# Patient Record
Sex: MALE | Race: BLACK OR AFRICAN AMERICAN | NOT HISPANIC OR LATINO | Employment: FULL TIME | URBAN - METROPOLITAN AREA
[De-identification: names, ages, dates, MRNs, and addresses within clinical notes are randomized per-mention and may not be internally consistent; named-entity substitution may affect disease eponyms.]

---
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
## 2020-01-02 ENCOUNTER — TELEPHONE (OUTPATIENT)
Dept: CARDIOLOGY | Facility: CLINIC | Age: 20
End: 2020-01-02

## 2020-01-02 NOTE — TELEPHONE ENCOUNTER
----- Message from Elva Mueller MA sent at 1/2/2020  3:40 PM CST -----  Contact: [phone]/self       ----- Message -----  From: Mayra Agosto  Sent: 1/2/2020   3:30 PM CST  To: [facility] Staff    Patient is requesting to speak with office regarding scheduling an appointment. Please advise.

## 2020-01-09 ENCOUNTER — OFFICE VISIT (OUTPATIENT)
Dept: CARDIOLOGY | Facility: CLINIC | Age: 20
End: 2020-01-09

## 2020-01-09 VITALS
WEIGHT: 232 LBS | HEIGHT: 68 IN | BODY MASS INDEX: 35.16 KG/M2 | OXYGEN SATURATION: 96 % | HEART RATE: 76 BPM | DIASTOLIC BLOOD PRESSURE: 70 MMHG | SYSTOLIC BLOOD PRESSURE: 114 MMHG

## 2020-01-09 DIAGNOSIS — Z86.79 HISTORY OF HYPERTROPHIC CARDIOMYOPATHY: ICD-10-CM

## 2020-01-09 PROCEDURE — 99213 PR OFFICE/OUTPT VISIT, EST, LEVL III, 20-29 MIN: ICD-10-PCS | Mod: S$PBB,,, | Performed by: INTERNAL MEDICINE

## 2020-01-09 PROCEDURE — 99999 PR PBB SHADOW E&M-EST. PATIENT-LVL III: ICD-10-PCS | Mod: PBBFAC,,, | Performed by: INTERNAL MEDICINE

## 2020-01-09 PROCEDURE — 99999 PR PBB SHADOW E&M-EST. PATIENT-LVL III: CPT | Mod: PBBFAC,,, | Performed by: INTERNAL MEDICINE

## 2020-01-09 PROCEDURE — 99213 OFFICE O/P EST LOW 20 MIN: CPT | Mod: S$PBB,,, | Performed by: INTERNAL MEDICINE

## 2020-01-09 PROCEDURE — 99213 OFFICE O/P EST LOW 20 MIN: CPT | Mod: PBBFAC,PN | Performed by: INTERNAL MEDICINE

## 2020-01-09 RX ORDER — METOPROLOL TARTRATE 25 MG/1
25 TABLET, FILM COATED ORAL 2 TIMES DAILY
Qty: 180 TABLET | Refills: 3 | Status: SHIPPED | OUTPATIENT
Start: 2020-01-09 | End: 2021-03-24 | Stop reason: SDUPTHER

## 2020-01-09 NOTE — PROGRESS NOTES
Subjective:    Patient ID:  Bennett Villarreal is a 19 y.o. male who presents for follow-up of Follow-up and Cardiomyopathy (ruled out athlete's heart)      PCP/Referring: Tatiana Jose DO     HPI  Pt is a 20 yo M w/ PMH of Hypertrophic Cardiomyopathy (age 14) who presents for f/u appt.  He was last seen by Dr. Bell 8/19/19 for hypertrophic cardiomyopathy evaluation for which he was noted to have asymmetric LVH however this was not c/w prior cardiac MRI which was negative for HOCM performed at an OSH (records in chart) and he was determined OK to play sports and continue metoprolol.  He mentions that he has been doing well.  He mentions that he has been doing well however requires a refill for his metoprolol.  He denies cp, sob, edema, orthopnea, PND, palpitations, presyncope, LOC, or claudication.  He is compliant w/ metoprolol and denies side effects.  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       Past Medical History:   Diagnosis Date    Hypertrophic cardiomyopathy 2014     History reviewed. No pertinent surgical history.  Social History     Socioeconomic History    Marital status: Single     Spouse name: Not on file    Number of children: Not on file    Years of education: Not on file    Highest education level: Not on file   Occupational History    Not on file   Social Needs    Financial resource strain: Not very hard    Food insecurity:     Worry: Never true     Inability: Never true    Transportation needs:     Medical: No     Non-medical: No   Tobacco Use    Smoking status: Never Smoker    Smokeless tobacco: Never Used   Substance and Sexual Activity    Alcohol use: Never     Frequency: Never     Drinks per session: Patient refused     Binge frequency: Patient refused    Drug use: Never    Sexual activity: Never   Lifestyle    Physical activity:     Days per week: 7 days     Minutes per session: 150+ min    Stress: Not at all   Relationships    Social  connections:     Talks on phone: More than three times a week     Gets together: Three times a week     Attends Hindu service: Not on file     Active member of club or organization: Yes     Attends meetings of clubs or organizations: More than 4 times per year     Relationship status: Patient refused   Other Topics Concern    Not on file   Social History Narrative    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 Has 1 brother and 1 sister     Family History   Problem Relation Age of Onset    Heart disease Mother     Hypertrophic cardiomyopathy Father     Asthma Sister     Asthma Brother     Heart disease Maternal Grandmother     Heart disease Maternal Grandfather     No Known Problems Paternal Grandmother     No Known Problems Paternal Grandfather        Review of patient's allergies indicates:   Allergen Reactions    Pcn [penicillins]        Medication List with Changes/Refills   Current Medications    IPRATROPIUM (ATROVENT HFA) 17 MCG/ACTUATION INHALER    Inhale 1 puff into the lungs every 6 (six) hours. Rescue   Changed and/or Refilled Medications    Modified Medication Previous Medication    METOPROLOL TARTRATE (LOPRESSOR) 25 MG TABLET metoprolol tartrate (LOPRESSOR) 25 MG tablet       Take 1 tablet (25 mg total) by mouth 2 (two) times daily.           Review of Systems   Constitution: Negative for diaphoresis and fever.   HENT: Negative for congestion and hearing loss.    Eyes: Negative for blurred vision and pain.   Cardiovascular: Negative for chest pain, claudication, dyspnea on exertion, leg swelling, near-syncope, palpitations and syncope.   Respiratory: Negative for shortness of breath and sleep disturbances due to breathing.    Hematologic/Lymphatic: Negative for bleeding problem. Does not bruise/bleed easily.   Skin: Negative for color change and poor wound healing.   Gastrointestinal: Negative for abdominal pain and nausea.   Genitourinary:  "Negative for bladder incontinence and flank pain.   Neurological: Negative for focal weakness and light-headedness.        Objective:   /70 (BP Location: Left arm, Patient Position: Sitting, BP Method: Large (Manual))   Pulse 76   Ht 5' 8" (1.727 m)   Wt 105.2 kg (232 lb)   SpO2 96%   BMI 35.28 kg/m²    Physical Exam   Constitutional: He is oriented to person, place, and time. He appears well-developed and well-nourished.   HENT:   Head: Normocephalic and atraumatic.   Mouth/Throat: Oropharynx is clear and moist.   Eyes: Pupils are equal, round, and reactive to light. EOM are normal. No scleral icterus.   Neck: Normal range of motion. Neck supple. No JVD present.   Cardiovascular: Normal rate, regular rhythm, S1 normal, S2 normal and intact distal pulses. Exam reveals no gallop and no friction rub.   No murmur heard.  Pulmonary/Chest: Effort normal and breath sounds normal. No respiratory distress. He has no wheezes. He has no rales. He exhibits no tenderness.   Abdominal: Soft. Bowel sounds are normal. He exhibits no distension and no mass. There is no tenderness. There is no rebound.   Musculoskeletal: Normal range of motion. He exhibits no edema or tenderness.   Neurological: He is alert and oriented to person, place, and time. He displays normal reflexes. Coordination normal.   Skin: Skin is warm and dry. He is not diaphoretic. No pallor.   Psychiatric: He has a normal mood and affect. His behavior is normal. Judgment normal.         Review of outside cardiology records show pt had a cardiac MRI in 9/2015 which showed:  No MRI evidence of asymetric septal hypertrophy to suggest the presence of HOCM.  Very mild thickening of the IVS present is present only in systole.    Normal myocardial perfusion and viability; normal LV mass.  Normal biventricular systolic function and wall motion without evidence of hypertrophy or dilatation.  Normal proximal course and caliber of coronary arteries.      Blood " pressure noted to be elevated at that time.  Pt started on metoprolol 25 bid with significant improvement.      EKG 8/5/2019:  Normal sinus rhythm  Possible Septal infarct ,age undetermined     Echo 8/13/2019:   · Asymmetric left ventricular hypertrophy.  · Normal left ventricular systolic function. The estimated ejection fraction is 60%  · Normal LV diastolic function.  · Normal right ventricular systolic function.  · Normal central venous pressure (3 mm Hg).    Assessment:       1. History of hypertrophic cardiomyopathy         Plan:         History of hypertrophic cardiomyopathy  Compensated.  Cardiac MRI was negative for HOCM and echo notes asymmetric LVH.  Pt was followed by Dr. Bell and noted to be OK to participate in sports.    - continue metoprolol  - OK for sports participation  - avoid dehydration       Total duration of face to face visit time 30 minutes.  Total time spent counseling greater than fifty percent of total visit time.  Counseling included discussion regarding imaging findings, diagnosis, possibilities, treatment options, risks and benefits.  The patient had many questions regarding the options and long-term effects      Weor Baum M.D.  Interventional Cardiology

## 2020-01-09 NOTE — ASSESSMENT & PLAN NOTE
Compensated.  Cardiac MRI was negative for HOCM and echo notes asymmetric LVH.  Pt was followed by Dr. Bell and noted to be OK to participate in sports.    - continue metoprolol  - OK for sports participation  - avoid dehydration

## 2020-10-05 ENCOUNTER — PATIENT MESSAGE (OUTPATIENT)
Dept: ADMINISTRATIVE | Facility: HOSPITAL | Age: 20
End: 2020-10-05

## 2021-01-04 ENCOUNTER — PATIENT MESSAGE (OUTPATIENT)
Dept: ADMINISTRATIVE | Facility: HOSPITAL | Age: 21
End: 2021-01-04

## 2021-03-25 ENCOUNTER — PATIENT MESSAGE (OUTPATIENT)
Dept: CARDIOLOGY | Facility: CLINIC | Age: 21
End: 2021-03-25

## 2021-04-12 ENCOUNTER — PATIENT MESSAGE (OUTPATIENT)
Dept: ADMINISTRATIVE | Facility: HOSPITAL | Age: 21
End: 2021-04-12

## 2021-05-04 ENCOUNTER — PATIENT MESSAGE (OUTPATIENT)
Dept: RESEARCH | Facility: HOSPITAL | Age: 21
End: 2021-05-04

## 2021-05-30 ENCOUNTER — IMMUNIZATION (OUTPATIENT)
Dept: PRIMARY CARE CLINIC | Facility: CLINIC | Age: 21
End: 2021-05-30

## 2021-05-30 DIAGNOSIS — Z23 NEED FOR VACCINATION: Primary | ICD-10-CM

## 2021-05-30 PROCEDURE — 0001A COVID-19, MRNA, LNP-S, PF, 30 MCG/0.3 ML DOSE VACCINE: CPT | Mod: CV19,,, | Performed by: FAMILY MEDICINE

## 2021-05-30 PROCEDURE — 0001A COVID-19, MRNA, LNP-S, PF, 30 MCG/0.3 ML DOSE VACCINE: ICD-10-PCS | Mod: CV19,,, | Performed by: FAMILY MEDICINE

## 2021-05-30 PROCEDURE — 91300 COVID-19, MRNA, LNP-S, PF, 30 MCG/0.3 ML DOSE VACCINE: CPT | Mod: ,,, | Performed by: FAMILY MEDICINE

## 2021-05-30 PROCEDURE — 91300 COVID-19, MRNA, LNP-S, PF, 30 MCG/0.3 ML DOSE VACCINE: ICD-10-PCS | Mod: ,,, | Performed by: FAMILY MEDICINE

## 2021-06-22 ENCOUNTER — IMMUNIZATION (OUTPATIENT)
Dept: FAMILY MEDICINE | Facility: CLINIC | Age: 21
End: 2021-06-22
Payer: MEDICAID

## 2021-06-22 DIAGNOSIS — Z23 NEED FOR VACCINATION: Primary | ICD-10-CM

## 2021-06-22 PROCEDURE — 91300 COVID-19, MRNA, LNP-S, PF, 30 MCG/0.3 ML DOSE VACCINE: CPT | Mod: ,,, | Performed by: FAMILY MEDICINE

## 2021-06-22 PROCEDURE — 91300 COVID-19, MRNA, LNP-S, PF, 30 MCG/0.3 ML DOSE VACCINE: ICD-10-PCS | Mod: ,,, | Performed by: FAMILY MEDICINE

## 2021-06-22 PROCEDURE — 0002A COVID-19, MRNA, LNP-S, PF, 30 MCG/0.3 ML DOSE VACCINE: ICD-10-PCS | Mod: CV19,,, | Performed by: FAMILY MEDICINE

## 2021-06-22 PROCEDURE — 0002A COVID-19, MRNA, LNP-S, PF, 30 MCG/0.3 ML DOSE VACCINE: CPT | Mod: CV19,,, | Performed by: FAMILY MEDICINE

## 2021-07-06 ENCOUNTER — PATIENT MESSAGE (OUTPATIENT)
Dept: ADMINISTRATIVE | Facility: HOSPITAL | Age: 21
End: 2021-07-06

## 2021-10-04 ENCOUNTER — PATIENT MESSAGE (OUTPATIENT)
Dept: ADMINISTRATIVE | Facility: HOSPITAL | Age: 21
End: 2021-10-04

## 2022-01-10 ENCOUNTER — PATIENT MESSAGE (OUTPATIENT)
Dept: ADMINISTRATIVE | Facility: HOSPITAL | Age: 22
End: 2022-01-10
Payer: MEDICAID

## 2022-05-31 ENCOUNTER — PATIENT MESSAGE (OUTPATIENT)
Dept: ADMINISTRATIVE | Facility: HOSPITAL | Age: 22
End: 2022-05-31
Payer: MEDICAID

## 2022-08-16 ENCOUNTER — IMMUNIZATION (OUTPATIENT)
Dept: FAMILY MEDICINE | Facility: CLINIC | Age: 22
End: 2022-08-16
Payer: MEDICAID

## 2022-08-16 DIAGNOSIS — Z23 NEED FOR VACCINATION: Primary | ICD-10-CM

## 2022-08-16 PROCEDURE — 91305 COVID-19, MRNA, LNP-S, PF, 30 MCG/0.3 ML DOSE VACCINE (PFIZER): CPT | Mod: PBBFAC,PN

## 2023-05-01 RX ORDER — METOPROLOL TARTRATE 25 MG/1
25 TABLET, FILM COATED ORAL 2 TIMES DAILY
Qty: 180 TABLET | Refills: 3 | Status: CANCELLED | OUTPATIENT
Start: 2023-05-01

## 2023-05-02 NOTE — TELEPHONE ENCOUNTER
Pt's mother answered the phone and stated she called about getting pt's med refilled, informed her that the pt would need to be seen before we can continue to refill the medication.   Pt mother stated the patient is sleeping but she will make an apointment with the patient via [employer] later today. Pt mother expressed understanding

## 2023-06-30 RX ORDER — METOPROLOL TARTRATE 25 MG/1
25 TABLET, FILM COATED ORAL 2 TIMES DAILY
Qty: 60 TABLET | Refills: 0 | Status: SHIPPED | OUTPATIENT
Start: 2023-06-30 | End: 2023-08-16 | Stop reason: SDUPTHER

## 2023-08-15 RX ORDER — METOPROLOL TARTRATE 25 MG/1
25 TABLET, FILM COATED ORAL 2 TIMES DAILY
Qty: 60 TABLET | Refills: 0 | OUTPATIENT
Start: 2023-08-15

## 2023-08-15 NOTE — TELEPHONE ENCOUNTER
Spoke with patient about a medication refill request. Scheduled an appt for 8/16 at 1:30pm. Patient expressed understanding

## 2023-08-16 ENCOUNTER — OFFICE VISIT (OUTPATIENT)
Dept: CARDIOLOGY | Facility: CLINIC | Age: 23
End: 2023-08-16
Payer: MEDICAID

## 2023-08-16 VITALS
HEIGHT: 68 IN | OXYGEN SATURATION: 97 % | HEART RATE: 72 BPM | SYSTOLIC BLOOD PRESSURE: 130 MMHG | WEIGHT: 274.19 LBS | BODY MASS INDEX: 41.56 KG/M2 | DIASTOLIC BLOOD PRESSURE: 84 MMHG

## 2023-08-16 DIAGNOSIS — Z86.79 HISTORY OF HYPERTROPHIC CARDIOMYOPATHY: Primary | ICD-10-CM

## 2023-08-16 PROCEDURE — 99213 OFFICE O/P EST LOW 20 MIN: CPT | Mod: PBBFAC,PN

## 2023-08-16 PROCEDURE — 99204 OFFICE O/P NEW MOD 45 MIN: CPT | Mod: S$PBB,,,

## 2023-08-16 PROCEDURE — 1159F PR MEDICATION LIST DOCUMENTED IN MEDICAL RECORD: ICD-10-PCS | Mod: CPTII,,,

## 2023-08-16 PROCEDURE — 3079F DIAST BP 80-89 MM HG: CPT | Mod: CPTII,,,

## 2023-08-16 PROCEDURE — 3075F SYST BP GE 130 - 139MM HG: CPT | Mod: CPTII,,,

## 2023-08-16 PROCEDURE — 99999 PR PBB SHADOW E&M-EST. PATIENT-LVL III: CPT | Mod: PBBFAC,,,

## 2023-08-16 PROCEDURE — 99204 PR OFFICE/OUTPT VISIT, NEW, LEVL IV, 45-59 MIN: ICD-10-PCS | Mod: S$PBB,,,

## 2023-08-16 PROCEDURE — 1160F RVW MEDS BY RX/DR IN RCRD: CPT | Mod: CPTII,,,

## 2023-08-16 PROCEDURE — 3079F PR MOST RECENT DIASTOLIC BLOOD PRESSURE 80-89 MM HG: ICD-10-PCS | Mod: CPTII,,,

## 2023-08-16 PROCEDURE — 99999 PR PBB SHADOW E&M-EST. PATIENT-LVL III: ICD-10-PCS | Mod: PBBFAC,,,

## 2023-08-16 PROCEDURE — 1159F MED LIST DOCD IN RCRD: CPT | Mod: CPTII,,,

## 2023-08-16 PROCEDURE — 3075F PR MOST RECENT SYSTOLIC BLOOD PRESS GE 130-139MM HG: ICD-10-PCS | Mod: CPTII,,,

## 2023-08-16 PROCEDURE — 1160F PR REVIEW ALL MEDS BY PRESCRIBER/CLIN PHARMACIST DOCUMENTED: ICD-10-PCS | Mod: CPTII,,,

## 2023-08-16 PROCEDURE — 3008F BODY MASS INDEX DOCD: CPT | Mod: CPTII,,,

## 2023-08-16 PROCEDURE — 3008F PR BODY MASS INDEX (BMI) DOCUMENTED: ICD-10-PCS | Mod: CPTII,,,

## 2023-08-16 RX ORDER — METOPROLOL TARTRATE 25 MG/1
25 TABLET, FILM COATED ORAL 2 TIMES DAILY
Qty: 180 TABLET | Refills: 3 | Status: SHIPPED | OUTPATIENT
Start: 2023-08-16 | End: 2023-12-23 | Stop reason: SDUPTHER

## 2023-08-16 NOTE — PROGRESS NOTES
Subjective:    Patient ID:  Bennett Villarreal is a 23 y.o. male who presents for follow-up of Medication Refill      PCP: Tatiana Jose DO     Referring Provider:     HPI: Pt is a 24 yo M w/ PMH of Hypertrophic Cardiomyopathy (age 14) who presents for f/u appt and medication refill. He was last seen by Dr. Baum on 1/9/2020 and was continued on medical therapyr hypertrophic cardiomyopathy.  He was seen prior by Dr. Bell 8/19/19 for hypertrophic cardiomyopathy evaluation for which he was noted to have asymmetric LVH however this was not c/w prior cardiac MRI which was negative for HOCM performed at an OSH (records in chart) and he was determined OK to play sports and continue metoprolol. He mentions that he has been doing well however requires a refill for his metoprolol. He relocated to [place] for work but returns home often. He denies cp, sob, edema, orthopnea, PND, palpitations, presyncope, LOC, or claudication.  He is compliant w/ metoprolol and denies side effects.  He exercises regularly and is very active at work and denies limitation.      Past Medical History:   Diagnosis Date    Hypertrophic cardiomyopathy 2014     No past surgical history on file.  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 Has 1 brother and 1 sister     Social Determinants of Health     Financial Resource Strain: Low Risk  (8/15/2023)    Overall Financial Resource Strain (CARDIA)     Difficulty of Paying Living Expenses: Not hard at all   Food Insecurity: No Food Insecurity (8/15/2023)    Hunger Vital Sign     Worried About Running Out of Food in the Last Year: Never true     Ran Out of Food in the Last Year: Never true    Transportation Needs: No Transportation Needs (8/15/2023)    PRAPARE - Transportation     Lack of Transportation (Medical): No     Lack of Transportation (Non-Medical): No   Physical Activity: Sufficiently Active (8/15/2023)    Exercise Vital Sign     Days of Exercise per Week: 6 days     Minutes of Exercise per Session: 150+ min   Stress: Stress Concern Present (8/15/2023)    [employer] [place] [employer] - Occupational Stress Questionnaire     Feeling of Stress : To some extent   Social Connections: Unknown (8/15/2023)    Social Connection and Isolation Panel [NHANES]     Frequency of Communication with Friends and Family: More than three times a week     Frequency of Social Gatherings with Friends and Family: More than three times a week     Active Member of Clubs or Organizations: No     Attends Club or Organization Meetings: Never     Marital Status: Never    Housing Stability: Low Risk  (8/15/2023)    Housing Stability Vital Sign     Unable to Pay for Housing in the Last Year: No     Number of Places Lived in the Last Year: 2     Unstable Housing in the Last Year: No     Family History   Problem Relation Age of Onset    Heart disease Mother     Hypertrophic cardiomyopathy Father     Asthma Sister     Asthma Brother     Heart disease Maternal Grandmother     Heart disease Maternal Grandfather     No Known Problems Paternal Grandmother     No Known Problems Paternal Grandfather        Review of patient's allergies indicates:   Allergen Reactions    Pcn [penicillins]        Medication List with Changes/Refills   Current Medications    HYDROXYZINE HCL (ATARAX) 25 MG TABLET    Take 1 tablet (25 mg total) by mouth every 6 (six) hours.    IPRATROPIUM (ATROVENT HFA) 17 MCG/ACTUATION INHALER    Inhale 1 puff into the lungs every 6 (six) hours. Rescue   Changed and/or Refilled Medications    Modified Medication Previous Medication    METOPROLOL TARTRATE (LOPRESSOR) 25 MG TABLET metoprolol tartrate  "(LOPRESSOR) 25 MG tablet       Take 1 tablet (25 mg total) by mouth 2 (two) times daily.    Take 1 tablet (25 mg total) by mouth 2 (two) times daily.       Review of Systems   Constitutional: Negative for diaphoresis and fever.   HENT:  Negative for congestion and hearing loss.    Eyes:  Negative for blurred vision and pain.   Cardiovascular:  Negative for chest pain, claudication, dyspnea on exertion, leg swelling, near-syncope, palpitations and syncope.   Respiratory:  Negative for shortness of breath and sleep disturbances due to breathing.    Hematologic/Lymphatic: Negative for bleeding problem. Does not bruise/bleed easily.   Skin:  Negative for color change and poor wound healing.   Gastrointestinal:  Negative for abdominal pain and nausea.   Genitourinary:  Negative for bladder incontinence and flank pain.   Neurological:  Negative for focal weakness and light-headedness.        Objective:   /84 (BP Location: Right arm, Patient Position: Sitting, BP Method: X-Large (Manual))   Pulse 72   Ht 5' 8" (1.727 m)   Wt 124.4 kg (274 lb 3.2 oz)   SpO2 97%   BMI 41.69 kg/m²    Physical Exam  Constitutional:       Appearance: He is well-developed. He is not diaphoretic.   HENT:      Head: Normocephalic and atraumatic.   Eyes:      General: No scleral icterus.     Pupils: Pupils are equal, round, and reactive to light.   Neck:      Vascular: No JVD.   Cardiovascular:      Rate and Rhythm: Normal rate and regular rhythm.      Pulses: Intact distal pulses.           Radial pulses are 2+ on the right side and 2+ on the left side.        Dorsalis pedis pulses are 2+ on the right side and 2+ on the left side.        Posterior tibial pulses are 2+ on the right side and 2+ on the left side.      Heart sounds: S1 normal and S2 normal. No murmur heard.     No friction rub. No gallop.   Pulmonary:      Effort: Pulmonary effort is normal. No respiratory distress.      Breath sounds: Normal breath sounds. No wheezing or " rales.   Chest:      Chest wall: No tenderness.   Abdominal:      General: Bowel sounds are normal. There is no distension.      Palpations: Abdomen is soft. There is no mass.      Tenderness: There is no abdominal tenderness. There is no rebound.   Musculoskeletal:         General: No tenderness. Normal range of motion.      Cervical back: Normal range of motion and neck supple.   Skin:     General: Skin is warm and dry.      Coloration: Skin is not pale.   Neurological:      Mental Status: He is alert and oriented to person, place, and time.      Coordination: Coordination normal.      Deep Tendon Reflexes: Reflexes normal.   Psychiatric:         Behavior: Behavior normal.         Judgment: Judgment normal.           Review of outside cardiology records show pt had a cardiac MRI in 9/2015 which showed:  No MRI evidence of asymetric septal hypertrophy to suggest the presence of HOCM.  Very mild thickening of the IVS present is present only in systole.    Normal myocardial perfusion and viability; normal LV mass.  Normal biventricular systolic function and wall motion without evidence of hypertrophy or dilatation.  Normal proximal course and caliber of coronary arteries.      Blood pressure noted to be elevated at that time.  Pt started on metoprolol 25 bid with significant improvement.       EKG 8/5/2019:  Normal sinus rhythm  Possible Septal infarct ,age undetermined     Echo 8/13/2019:   Asymmetric left ventricular hypertrophy.  Normal left ventricular systolic function. The estimated ejection fraction is 60%  Normal LV diastolic function.  Normal right ventricular systolic function.  Normal central venous pressure (3 mm Hg).    Assessment:       1. History of hypertrophic cardiomyopathy         Plan:         History of hypertrophic cardiomyopathy  Compensated.    Cardiac MRI was negative for HOCM and echo notes asymmetric LVH.  Pt was followed by Dr. Bell and noted to be OK to participate in sports.    -  continue metoprolol  - avoid dehydration       Total duration of face to face visit time 30 minutes.  Total time spent counseling greater than fifty percent of total visit time.  Counseling included discussion regarding imaging findings, diagnosis, possibilities, treatment options, risks and benefits.  The patient had many questions regarding the options and long-term effects      Adama Bunn NP  Cardiology

## 2023-08-16 NOTE — ASSESSMENT & PLAN NOTE
Compensated.    Cardiac MRI was negative for HOCM and echo notes asymmetric LVH.  Pt was followed by Dr. Bell and noted to be OK to participate in sports.    - continue metoprolol  - avoid dehydration

## 2023-08-21 ENCOUNTER — PATIENT MESSAGE (OUTPATIENT)
Dept: RESEARCH | Facility: HOSPITAL | Age: 23
End: 2023-08-21
Payer: MEDICAID

## 2023-12-23 DIAGNOSIS — Z86.79 HISTORY OF HYPERTROPHIC CARDIOMYOPATHY: ICD-10-CM

## 2023-12-27 RX ORDER — METOPROLOL TARTRATE 25 MG/1
25 TABLET, FILM COATED ORAL 2 TIMES DAILY
Qty: 180 TABLET | Refills: 3 | Status: SHIPPED | OUTPATIENT
Start: 2023-12-27 | End: 2024-02-22 | Stop reason: SDUPTHER

## 2024-02-22 DIAGNOSIS — Z86.79 HISTORY OF HYPERTROPHIC CARDIOMYOPATHY: ICD-10-CM

## 2024-02-29 RX ORDER — METOPROLOL TARTRATE 25 MG/1
25 TABLET, FILM COATED ORAL 2 TIMES DAILY
Qty: 180 TABLET | Refills: 3 | Status: SHIPPED | OUTPATIENT
Start: 2024-02-29

## 2024-11-01 DIAGNOSIS — Z86.79 HISTORY OF HYPERTROPHIC CARDIOMYOPATHY: ICD-10-CM

## 2024-11-04 ENCOUNTER — PATIENT MESSAGE (OUTPATIENT)
Dept: CARDIOLOGY | Facility: CLINIC | Age: 24
End: 2024-11-04
Payer: MEDICAID

## 2024-11-04 RX ORDER — METOPROLOL TARTRATE 25 MG/1
25 TABLET, FILM COATED ORAL 2 TIMES DAILY
Qty: 180 TABLET | Refills: 0 | Status: SHIPPED | OUTPATIENT
Start: 2024-11-04

## 2025-04-16 ENCOUNTER — OFFICE VISIT (OUTPATIENT)
Dept: CARDIOLOGY | Facility: CLINIC | Age: 25
End: 2025-04-16
Payer: MEDICAID

## 2025-04-16 VITALS
HEIGHT: 68 IN | DIASTOLIC BLOOD PRESSURE: 82 MMHG | BODY MASS INDEX: 47.74 KG/M2 | WEIGHT: 315 LBS | SYSTOLIC BLOOD PRESSURE: 128 MMHG | OXYGEN SATURATION: 96 % | HEART RATE: 70 BPM

## 2025-04-16 DIAGNOSIS — Z86.79 HISTORY OF HYPERTROPHIC CARDIOMYOPATHY: Primary | ICD-10-CM

## 2025-04-16 DIAGNOSIS — E66.01 MORBID OBESITY WITH BMI OF 45.0-49.9, ADULT: ICD-10-CM

## 2025-04-16 PROCEDURE — 3074F SYST BP LT 130 MM HG: CPT | Mod: CPTII,,,

## 2025-04-16 PROCEDURE — 99999 PR PBB SHADOW E&M-EST. PATIENT-LVL III: CPT | Mod: PBBFAC,,,

## 2025-04-16 PROCEDURE — 99213 OFFICE O/P EST LOW 20 MIN: CPT | Mod: PBBFAC,PN

## 2025-04-16 PROCEDURE — 3008F BODY MASS INDEX DOCD: CPT | Mod: CPTII,,,

## 2025-04-16 PROCEDURE — 3079F DIAST BP 80-89 MM HG: CPT | Mod: CPTII,,,

## 2025-04-16 PROCEDURE — 1160F RVW MEDS BY RX/DR IN RCRD: CPT | Mod: CPTII,,,

## 2025-04-16 PROCEDURE — 1159F MED LIST DOCD IN RCRD: CPT | Mod: CPTII,,,

## 2025-04-16 PROCEDURE — 99213 OFFICE O/P EST LOW 20 MIN: CPT | Mod: S$PBB,,,

## 2025-04-16 RX ORDER — METOPROLOL TARTRATE 25 MG/1
25 TABLET, FILM COATED ORAL 2 TIMES DAILY
Qty: 180 TABLET | Refills: 3 | Status: SHIPPED | OUTPATIENT
Start: 2025-04-16

## 2025-04-16 NOTE — ASSESSMENT & PLAN NOTE
Compensated.    Cardiac MRI was negative for HOCM and echo notes asymmetric LVH.  Pt was followed by Dr. Bell and noted to be OK to participate in sports.    - continue metoprolol  - avoid dehydration   -Cardiac echo to evaluate heart function

## 2025-04-16 NOTE — ASSESSMENT & PLAN NOTE
Body mass index is 49.61 kg/m². Morbid obesity complicates all aspects of disease management from diagnostic modalities to treatment. Weight loss encouraged and health benefits explained to patient.

## 2025-04-16 NOTE — PROGRESS NOTES
Subjective:    Patient ID:  Bennett Villarreal is a 24 y.o. male who presents for follow-up of No chief complaint on file.      PCP: Tatiana Jose DO     Referring Provider:     HPI: Pt is a 24 yo M w/ PMH of Hypertrophic Cardiomyopathy (age 14), obesity  who presents for f/u appt and medication refill. He was last seen on 8/16/2023  for f/u appt and medication refill. He was continued on medical therapy and has been lost to follow up as he was working and living in [place]. He was previously followed by Dr. Baum on 1/9/2020 and was continued on medical therapyr hypertrophic cardiomyopathy. He was originally seen  by Dr. Bell 8/19/19 for hypertrophic cardiomyopathy evaluation for which he was noted to have asymmetric LVH however this was not c/w prior cardiac MRI which was negative for HOCM performed at an OSH (records in chart) and he was determined OK to play sports and continue metoprolol.   He mentions that he has been doing well and needs to get back in touch with his providers.He relocated to [place] for work and has just moved back home. He denies cp, sob, edema, orthopnea, PND, palpitations, presyncope, LOC, or claudication.  He is compliant w/ metoprolol and denies side effects.  He exercises regularly and is very active at work and denies limitation.      Past Medical History:   Diagnosis Date    Hypertrophic cardiomyopathy 2014     No past surgical history on file.  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 Has 1 brother and 1 sister     Social Drivers of Health     Financial Resource Strain: Low Risk  (4/15/2025)    Overall Financial Resource Strain (CARDIA)     Difficulty of Paying Living  Expenses: Not very hard   Food Insecurity: No Food Insecurity (4/15/2025)    Hunger Vital Sign     Worried About Running Out of Food in the Last Year: Never true     Ran Out of Food in the Last Year: Never true   Transportation Needs: No Transportation Needs (4/15/2025)    PRAPARE - Transportation     Lack of Transportation (Medical): No     Lack of Transportation (Non-Medical): No   Physical Activity: Sufficiently Active (4/15/2025)    Exercise Vital Sign     Days of Exercise per Week: 5 days     Minutes of Exercise per Session: 150+ min   Stress: Stress Concern Present (4/15/2025)    [employer] - Occupational Stress Questionnaire     Feeling of Stress : To some extent   Housing Stability: High Risk (4/15/2025)    Housing Stability Vital Sign     Unable to Pay for Housing in the Last Year: No     Number of Times Moved in the Last Year: 2     Homeless in the Last Year: No     Family History   Problem Relation Name Age of Onset    Heart disease Mother      Hypertrophic cardiomyopathy Father      Asthma Sister      Asthma Brother      Heart disease Maternal Grandmother      Heart disease Maternal Grandfather      No Known Problems Paternal Grandmother      No Known Problems Paternal Grandfather         Review of patient's allergies indicates:   Allergen Reactions    Pcn [penicillins]        Medication List with Changes/Refills   Changed and/or Refilled Medications    Modified Medication Previous Medication    METOPROLOL TARTRATE (LOPRESSOR) 25 MG TABLET metoprolol tartrate (LOPRESSOR) 25 MG tablet       Take 1 tablet (25 mg total) by mouth 2 (two) times daily.    Take 1 tablet (25 mg total) by mouth 2 (two) times daily.   Discontinued Medications    HYDROXYZINE HCL (ATARAX) 25 MG TABLET    Take 1 tablet (25 mg total) by mouth every 6 (six) hours.    IPRATROPIUM (ATROVENT HFA) 17 MCG/ACTUATION INHALER    Inhale 1 puff into the lungs every 6 (six) hours. Rescue       Review of Systems  "  Constitutional: Negative for diaphoresis and fever.   HENT:  Negative for congestion and hearing loss.    Eyes:  Negative for blurred vision and pain.   Cardiovascular:  Negative for chest pain, claudication, dyspnea on exertion, leg swelling, near-syncope, palpitations and syncope.   Respiratory:  Negative for shortness of breath and sleep disturbances due to breathing.    Hematologic/Lymphatic: Negative for bleeding problem. Does not bruise/bleed easily.   Skin:  Negative for color change and poor wound healing.   Gastrointestinal:  Negative for abdominal pain and nausea.   Genitourinary:  Negative for bladder incontinence and flank pain.   Neurological:  Negative for focal weakness and light-headedness.        Objective:   /82 (BP Location: Right arm, Patient Position: Sitting)   Pulse 70   Ht 5' 8" (1.727 m)   Wt (!) 148 kg (326 lb 4.5 oz)   BMI 49.61 kg/m²    Physical Exam  Constitutional:       Appearance: He is well-developed. He is not diaphoretic.   HENT:      Head: Normocephalic and atraumatic.   Eyes:      General: No scleral icterus.     Pupils: Pupils are equal, round, and reactive to light.   Neck:      Vascular: No JVD.   Cardiovascular:      Rate and Rhythm: Normal rate and regular rhythm.      Pulses: Intact distal pulses.           Radial pulses are 2+ on the right side and 2+ on the left side.        Dorsalis pedis pulses are 2+ on the right side and 2+ on the left side.        Posterior tibial pulses are 2+ on the right side and 2+ on the left side.      Heart sounds: S1 normal and S2 normal. No murmur heard.     No friction rub. No gallop.   Pulmonary:      Effort: Pulmonary effort is normal. No respiratory distress.      Breath sounds: Normal breath sounds. No wheezing or rales.   Chest:      Chest wall: No tenderness.   Abdominal:      General: Bowel sounds are normal. There is no distension.      Palpations: Abdomen is soft. There is no mass.      Tenderness: There is no abdominal " tenderness. There is no rebound.   Musculoskeletal:         General: No tenderness. Normal range of motion.      Cervical back: Normal range of motion and neck supple.   Skin:     General: Skin is warm and dry.      Coloration: Skin is not pale.   Neurological:      Mental Status: He is alert and oriented to person, place, and time.      Coordination: Coordination normal.      Deep Tendon Reflexes: Reflexes normal.   Psychiatric:         Behavior: Behavior normal.         Judgment: Judgment normal.             Review of outside cardiology records show pt had a cardiac MRI in 9/2015 which showed:  No MRI evidence of asymetric septal hypertrophy to suggest the presence of HOCM.  Very mild thickening of the IVS present is present only in systole.    Normal myocardial perfusion and viability; normal LV mass.  Normal biventricular systolic function and wall motion without evidence of hypertrophy or dilatation.  Normal proximal course and caliber of coronary arteries.      Blood pressure noted to be elevated at that time.  Pt started on metoprolol 25 bid with significant improvement.       EKG 8/5/2019:  Normal sinus rhythm  Possible Septal infarct ,age undetermined     Echo 8/13/2019:   Asymmetric left ventricular hypertrophy.  Normal left ventricular systolic function. The estimated ejection fraction is 60%  Normal LV diastolic function.  Normal right ventricular systolic function.  Normal central venous pressure (3 mm Hg).    Assessment:       1. History of hypertrophic cardiomyopathy    2. Morbid obesity with BMI of 45.0-49.9, adult           Plan:         History of hypertrophic cardiomyopathy  Compensated.    Cardiac MRI was negative for HOCM and echo notes asymmetric LVH.  Pt was followed by Dr. Bell and noted to be OK to participate in sports.    - continue metoprolol  - avoid dehydration   -Cardiac echo to evaluate heart function     Morbid obesity with BMI of 45.0-49.9, adult  Body mass index is 49.61 kg/m².  Morbid obesity complicates all aspects of disease management from diagnostic modalities to treatment. Weight loss encouraged and health benefits explained to patient.       Total duration of face to face visit time 30 minutes.  Total time spent counseling greater than fifty percent of total visit time.  Counseling included discussion regarding imaging findings, diagnosis, possibilities, treatment options, risks and benefits.  The patient had many questions regarding the options and long-term effects      Adama Bunn, ALANIS  Cardiology

## 2025-05-14 ENCOUNTER — TELEPHONE (OUTPATIENT)
Dept: CARDIOLOGY | Facility: CLINIC | Age: 25
End: 2025-05-14
Payer: MEDICAID

## 2025-05-14 ENCOUNTER — RESULTS FOLLOW-UP (OUTPATIENT)
Dept: CARDIOLOGY | Facility: CLINIC | Age: 25
End: 2025-05-14

## 2025-05-14 NOTE — TELEPHONE ENCOUNTER
I reached out to Mr. Villarreal and informed Him of his results & HE expressed understanding of the results.    Lori Mcgovern  Medical Assistant  [facility]  [phone] - Office  [phone] - Fax        ----- Message from Adama Bunn NP sent at 5/14/2025  4:06 PM CDT -----  Please inform, Mr. Villarreal the heart function is within normal limits. Cardiac ultrasound is consistent with prior study. No change in treatment is required.     Thank you,  Adama Bunn DNP   ----- Message -----  From: Sudeep Galloway MD  Sent: 5/11/2025   9:18 AM CDT  To: Adama Bunn NP

## 2025-07-31 ENCOUNTER — PATIENT MESSAGE (OUTPATIENT)
Dept: CARDIOLOGY | Facility: CLINIC | Age: 25
End: 2025-07-31
Payer: MEDICAID